# Patient Record
Sex: MALE | Race: WHITE | NOT HISPANIC OR LATINO | Employment: OTHER | URBAN - METROPOLITAN AREA
[De-identification: names, ages, dates, MRNs, and addresses within clinical notes are randomized per-mention and may not be internally consistent; named-entity substitution may affect disease eponyms.]

---

## 2018-06-10 ENCOUNTER — APPOINTMENT (OUTPATIENT)
Dept: GENERAL RADIOLOGY | Facility: HOSPITAL | Age: 83
End: 2018-06-10

## 2018-06-10 ENCOUNTER — HOSPITAL ENCOUNTER (EMERGENCY)
Facility: HOSPITAL | Age: 83
Discharge: HOME | End: 2018-06-11
Attending: EMERGENCY MEDICINE | Admitting: EMERGENCY MEDICINE

## 2018-06-10 DIAGNOSIS — R31.9 HEMATURIA, UNSPECIFIED TYPE: ICD-10-CM

## 2018-06-10 DIAGNOSIS — N20.1 URETEROLITHIASIS: ICD-10-CM

## 2018-06-10 DIAGNOSIS — N32.89 MASS OF URINARY BLADDER: ICD-10-CM

## 2018-06-10 DIAGNOSIS — R10.84 GENERALIZED ABDOMINAL PAIN: Primary | ICD-10-CM

## 2018-06-10 LAB
ALBUMIN SERPL-MCNC: 3.3 G/DL (ref 3.5–5.2)
ALBUMIN/GLOB SERPL: 1 G/DL
ALP SERPL-CCNC: 69 U/L (ref 40–129)
ALT SERPL W P-5'-P-CCNC: 10 U/L (ref 5–41)
ANION GAP SERPL CALCULATED.3IONS-SCNC: 10.1 MMOL/L
AST SERPL-CCNC: 20 U/L (ref 5–40)
BASOPHILS # BLD AUTO: 0.03 10*3/MM3 (ref 0–0.2)
BASOPHILS NFR BLD AUTO: 0.4 % (ref 0–2)
BILIRUB SERPL-MCNC: 0.2 MG/DL (ref 0.2–1.2)
BUN BLD-MCNC: 15 MG/DL (ref 8–23)
BUN/CREAT SERPL: 12 (ref 7–25)
CALCIUM SPEC-SCNC: 9 MG/DL (ref 8.8–10.5)
CHLORIDE SERPL-SCNC: 103 MMOL/L (ref 98–107)
CO2 SERPL-SCNC: 25.9 MMOL/L (ref 22–29)
CREAT BLD-MCNC: 1.25 MG/DL (ref 0.76–1.27)
DEPRECATED RDW RBC AUTO: 46.8 FL (ref 37–54)
EOSINOPHIL # BLD AUTO: 0.08 10*3/MM3 (ref 0.1–0.3)
EOSINOPHIL NFR BLD AUTO: 1.1 % (ref 0–4)
ERYTHROCYTE [DISTWIDTH] IN BLOOD BY AUTOMATED COUNT: 13.8 % (ref 11.5–14.5)
GFR SERPL CREATININE-BSD FRML MDRD: 54 ML/MIN/1.73
GLOBULIN UR ELPH-MCNC: 3.2 GM/DL
GLUCOSE BLD-MCNC: 104 MG/DL (ref 65–99)
HCT VFR BLD AUTO: 44.3 % (ref 42–52)
HGB BLD-MCNC: 14.6 G/DL (ref 14–18)
IMM GRANULOCYTES # BLD: 0.03 10*3/MM3 (ref 0–0.03)
IMM GRANULOCYTES NFR BLD: 0.4 % (ref 0–0.5)
LARGE PLATELETS: NORMAL
LIPASE SERPL-CCNC: 39 U/L (ref 13–60)
LYMPHOCYTES # BLD AUTO: 1.61 10*3/MM3 (ref 0.6–4.8)
LYMPHOCYTES NFR BLD AUTO: 21.6 % (ref 20–45)
MCH RBC QN AUTO: 30.3 PG (ref 27–31)
MCHC RBC AUTO-ENTMCNC: 33 G/DL (ref 31–37)
MCV RBC AUTO: 91.9 FL (ref 80–94)
MONOCYTES # BLD AUTO: 0.51 10*3/MM3 (ref 0–1)
MONOCYTES NFR BLD AUTO: 6.9 % (ref 3–8)
NEUTROPHILS # BLD AUTO: 5.18 10*3/MM3 (ref 1.5–8.3)
NEUTROPHILS NFR BLD AUTO: 69.6 % (ref 45–70)
NRBC BLD MANUAL-RTO: 0 /100 WBC (ref 0–0)
PLATELET # BLD AUTO: 77 10*3/MM3 (ref 140–500)
PMV BLD AUTO: 11.4 FL (ref 7.4–10.4)
POTASSIUM BLD-SCNC: 3.8 MMOL/L (ref 3.5–5.2)
PROT SERPL-MCNC: 6.5 G/DL (ref 6–8.5)
RBC # BLD AUTO: 4.82 10*6/MM3 (ref 4.7–6.1)
RBC MORPH BLD: NORMAL
SMALL PLATELETS BLD QL SMEAR: NORMAL
SODIUM BLD-SCNC: 139 MMOL/L (ref 136–145)
TROPONIN T SERPL-MCNC: <0.01 NG/ML (ref 0–0.03)
WBC MORPH BLD: NORMAL
WBC NRBC COR # BLD: 7.44 10*3/MM3 (ref 4.8–10.8)

## 2018-06-10 PROCEDURE — 80053 COMPREHEN METABOLIC PANEL: CPT | Performed by: EMERGENCY MEDICINE

## 2018-06-10 PROCEDURE — 93010 ELECTROCARDIOGRAM REPORT: CPT | Performed by: INTERNAL MEDICINE

## 2018-06-10 PROCEDURE — 96374 THER/PROPH/DIAG INJ IV PUSH: CPT

## 2018-06-10 PROCEDURE — 71046 X-RAY EXAM CHEST 2 VIEWS: CPT

## 2018-06-10 PROCEDURE — 83690 ASSAY OF LIPASE: CPT | Performed by: EMERGENCY MEDICINE

## 2018-06-10 PROCEDURE — 99285 EMERGENCY DEPT VISIT HI MDM: CPT

## 2018-06-10 PROCEDURE — 99284 EMERGENCY DEPT VISIT MOD MDM: CPT | Performed by: EMERGENCY MEDICINE

## 2018-06-10 PROCEDURE — 93005 ELECTROCARDIOGRAM TRACING: CPT | Performed by: EMERGENCY MEDICINE

## 2018-06-10 PROCEDURE — 85007 BL SMEAR W/DIFF WBC COUNT: CPT | Performed by: EMERGENCY MEDICINE

## 2018-06-10 PROCEDURE — 85025 COMPLETE CBC W/AUTO DIFF WBC: CPT | Performed by: EMERGENCY MEDICINE

## 2018-06-10 PROCEDURE — 84484 ASSAY OF TROPONIN QUANT: CPT | Performed by: EMERGENCY MEDICINE

## 2018-06-10 RX ORDER — FAMOTIDINE 20 MG/50ML
INJECTION, SOLUTION INTRAVENOUS
Status: DISCONTINUED
Start: 2018-06-10 | End: 2018-06-11 | Stop reason: HOSPADM

## 2018-06-10 RX ORDER — FAMOTIDINE 10 MG/ML
20 INJECTION, SOLUTION INTRAVENOUS ONCE
Status: COMPLETED | OUTPATIENT
Start: 2018-06-10 | End: 2018-06-10

## 2018-06-10 RX ORDER — ALUMINA, MAGNESIA, AND SIMETHICONE 2400; 2400; 240 MG/30ML; MG/30ML; MG/30ML
15 SUSPENSION ORAL ONCE
Status: COMPLETED | OUTPATIENT
Start: 2018-06-10 | End: 2018-06-10

## 2018-06-10 RX ORDER — SODIUM CHLORIDE 0.9 % (FLUSH) 0.9 %
10 SYRINGE (ML) INJECTION AS NEEDED
Status: DISCONTINUED | OUTPATIENT
Start: 2018-06-10 | End: 2018-06-11 | Stop reason: HOSPADM

## 2018-06-10 RX ADMIN — FAMOTIDINE 20 MG: 10 INJECTION, SOLUTION INTRAVENOUS at 23:16

## 2018-06-10 RX ADMIN — LIDOCAINE HYDROCHLORIDE 15 ML: 20 SOLUTION ORAL; TOPICAL at 23:13

## 2018-06-10 RX ADMIN — ALUMINUM HYDROXIDE, MAGNESIUM HYDROXIDE, AND DIMETHICONE 15 ML: 400; 400; 40 SUSPENSION ORAL at 23:13

## 2018-06-11 ENCOUNTER — APPOINTMENT (OUTPATIENT)
Dept: CT IMAGING | Facility: HOSPITAL | Age: 83
End: 2018-06-11

## 2018-06-11 VITALS
HEART RATE: 85 BPM | OXYGEN SATURATION: 91 % | RESPIRATION RATE: 19 BRPM | HEIGHT: 68 IN | TEMPERATURE: 98.1 F | WEIGHT: 185 LBS | BODY MASS INDEX: 28.04 KG/M2 | SYSTOLIC BLOOD PRESSURE: 121 MMHG | DIASTOLIC BLOOD PRESSURE: 92 MMHG

## 2018-06-11 LAB
BACTERIA UR QL AUTO: ABNORMAL /HPF
BILIRUB UR QL STRIP: NEGATIVE
CLARITY UR: CLEAR
COLOR UR: YELLOW
GLUCOSE UR STRIP-MCNC: NEGATIVE MG/DL
HGB UR QL STRIP.AUTO: ABNORMAL
HYALINE CASTS UR QL AUTO: ABNORMAL /LPF
KETONES UR QL STRIP: NEGATIVE
LEUKOCYTE ESTERASE UR QL STRIP.AUTO: NEGATIVE
NITRITE UR QL STRIP: NEGATIVE
PH UR STRIP.AUTO: 6.5 [PH] (ref 4.5–8)
PROT UR QL STRIP: NEGATIVE
RBC # UR: ABNORMAL /HPF
REF LAB TEST METHOD: ABNORMAL
SP GR UR STRIP: 1.01 (ref 1–1.03)
SQUAMOUS #/AREA URNS HPF: ABNORMAL /HPF
UROBILINOGEN UR QL STRIP: ABNORMAL
WBC UR QL AUTO: ABNORMAL /HPF

## 2018-06-11 PROCEDURE — 81001 URINALYSIS AUTO W/SCOPE: CPT | Performed by: EMERGENCY MEDICINE

## 2018-06-11 PROCEDURE — 74176 CT ABD & PELVIS W/O CONTRAST: CPT

## 2018-06-11 RX ORDER — HYDROCODONE BITARTRATE AND ACETAMINOPHEN 5; 325 MG/1; MG/1
1 TABLET ORAL ONCE
Status: COMPLETED | OUTPATIENT
Start: 2018-06-11 | End: 2018-06-11

## 2018-06-11 RX ORDER — DOCUSATE SODIUM 100 MG/1
100 CAPSULE, LIQUID FILLED ORAL 2 TIMES DAILY PRN
Qty: 30 CAPSULE | Refills: 0 | Status: SHIPPED | OUTPATIENT
Start: 2018-06-11

## 2018-06-11 RX ORDER — HYDROCODONE BITARTRATE AND ACETAMINOPHEN 5; 325 MG/1; MG/1
TABLET ORAL
Qty: 20 TABLET | Refills: 0 | Status: SHIPPED | OUTPATIENT
Start: 2018-06-11

## 2018-06-11 RX ADMIN — HYDROCODONE BITARTRATE AND ACETAMINOPHEN 1 TABLET: 5; 325 TABLET ORAL at 03:12

## 2018-06-11 NOTE — ED NOTES
Pt reminded that he cannot have anything to drink until CT results are back and per MD approval.     Rebeca Salcedo RN  06/11/18 3148

## 2018-06-11 NOTE — ED NOTES
"Pt states \"i don't know if I can pee or not\". Attempting to urinate     Rebeca Salcedo RN  06/11/18 0030    "

## 2018-06-11 NOTE — ED PROVIDER NOTES
EMERGENCY DEPARTMENT ENCOUNTER      Room Number: 5/05      HPI:    Chief complaint: Abdominal pain    Location: Periumbilical then radiated up the left flank posterior chest    Quality/Severity:  Sharp    Timing/Duration: Abrupt onset 1 hour prior to arrival, much improved at this time    Modifying Factors: None clearly identified    Associated Symptoms: No nausea or vomiting.  Occasional bright red blood noted on tissue paper after stooling but no dark black stools noted.  Some cough near baseline area no fevers.    Narrative: Pt is a 89 y.o. male who presents complaining of transient episode of sharp abdominal pain that began.  Umbilical radiating to the left flank and up the posterior chest.  Symptoms significant improved at this time.  Began shortly after eating supper.      PMD: PHYLLIS Mullins    REVIEW OF SYSTEMS  Review of Systems  All other systems reviewed are otherwise negative as related chief complaint    PAST MEDICAL HISTORY  Active Ambulatory Problems     Diagnosis Date Noted   • No Active Ambulatory Problems     Resolved Ambulatory Problems     Diagnosis Date Noted   • No Resolved Ambulatory Problems     Past Medical History:   Diagnosis Date   • COPD (chronic obstructive pulmonary disease)    • GERD (gastroesophageal reflux disease)    • Peptic ulcer    • Prostate cancer    • Pulmonary embolism        PAST SURGICAL HISTORY  Past Surgical History:   Procedure Laterality Date   • ENDOSCOPY W/ PEG REMOVAL     • EYE ENUCLEATION Left        FAMILY HISTORY  History reviewed. No pertinent family history.    SOCIAL HISTORY  Social History     Social History   • Marital status:      Spouse name: N/A   • Number of children: N/A   • Years of education: N/A     Occupational History   • Not on file.     Social History Main Topics   • Smoking status: Current Every Day Smoker     Packs/day: 2.00     Types: Cigarettes   • Smokeless tobacco: Not on file   • Alcohol use No   • Drug use: No   • Sexual  activity: Defer     Other Topics Concern   • Not on file     Social History Narrative   • No narrative on file       ALLERGIES  Patient has no known allergies.    PHYSICAL EXAM  ED Triage Vitals [06/10/18 2241]   Temp Heart Rate Resp BP SpO2   98.1 °F (36.7 °C) 80 18 141/66 92 %      Temp src Heart Rate Source Patient Position BP Location FiO2 (%)   Oral -- -- Right arm --         Physical Exam   Constitutional: He is oriented to person, place, and time and well-developed, well-nourished, and in no distress. No distress.   HENT:   Head: Normocephalic.   Mucous membranes moist   Eyes: No scleral icterus.   False left eye   Neck:   Painless range of motion   Cardiovascular: Normal rate and regular rhythm.    Pulmonary/Chest: Effort normal. No respiratory distress. He exhibits no tenderness.   Diminished but clear   Abdominal: Soft.   Large soft abdomen with minimal tenderness in the epigastric region with deep palpation.  No rebound or rigidity noted.  No CVA tenderness.   Musculoskeletal: Edema: ild pitting edema bilateral ankles.   Moves all extremities equally   Neurological: He is alert and oriented to person, place, and time.   Skin: Skin is warm and dry.   Psychiatric: Mood and affect normal.   Nursing note and vitals reviewed.      LAB RESULTS  Results for orders placed or performed during the hospital encounter of 06/10/18   Comprehensive Metabolic Panel   Result Value Ref Range    Glucose 104 (H) 65 - 99 mg/dL    BUN 15 8 - 23 mg/dL    Creatinine 1.25 0.76 - 1.27 mg/dL    Sodium 139 136 - 145 mmol/L    Potassium 3.8 3.5 - 5.2 mmol/L    Chloride 103 98 - 107 mmol/L    CO2 25.9 22.0 - 29.0 mmol/L    Calcium 9.0 8.8 - 10.5 mg/dL    Total Protein 6.5 6.0 - 8.5 g/dL    Albumin 3.30 (L) 3.50 - 5.20 g/dL    ALT (SGPT) 10 5 - 41 U/L    AST (SGOT) 20 5 - 40 U/L    Alkaline Phosphatase 69 40 - 129 U/L    Total Bilirubin 0.2 0.2 - 1.2 mg/dL    eGFR Non African Amer 54 (L) >60 mL/min/1.73    Globulin 3.2 gm/dL    A/G  Ratio 1.0 g/dL    BUN/Creatinine Ratio 12.0 7.0 - 25.0    Anion Gap 10.1 mmol/L   Troponin   Result Value Ref Range    Troponin T <0.010 0.000 - 0.030 ng/mL   CBC Auto Differential   Result Value Ref Range    WBC 7.44 4.80 - 10.80 10*3/mm3    RBC 4.82 4.70 - 6.10 10*6/mm3    Hemoglobin 14.6 14.0 - 18.0 g/dL    Hematocrit 44.3 42.0 - 52.0 %    MCV 91.9 80.0 - 94.0 fL    MCH 30.3 27.0 - 31.0 pg    MCHC 33.0 31.0 - 37.0 g/dL    RDW 13.8 11.5 - 14.5 %    RDW-SD 46.8 37.0 - 54.0 fl    MPV 11.4 (H) 7.4 - 10.4 fL    Platelets 77 (L) 140 - 500 10*3/mm3    Neutrophil % 69.6 45.0 - 70.0 %    Lymphocyte % 21.6 20.0 - 45.0 %    Monocyte % 6.9 3.0 - 8.0 %    Eosinophil % 1.1 0.0 - 4.0 %    Basophil % 0.4 0.0 - 2.0 %    Immature Grans % 0.4 0.0 - 0.5 %    Neutrophils, Absolute 5.18 1.50 - 8.30 10*3/mm3    Lymphocytes, Absolute 1.61 0.60 - 4.80 10*3/mm3    Monocytes, Absolute 0.51 0.00 - 1.00 10*3/mm3    Eosinophils, Absolute 0.08 (L) 0.10 - 0.30 10*3/mm3    Basophils, Absolute 0.03 0.00 - 0.20 10*3/mm3    Immature Grans, Absolute 0.03 0.00 - 0.03 10*3/mm3    nRBC 0.0 0.0 - 0.0 /100 WBC   Lipase   Result Value Ref Range    Lipase 39 13 - 60 U/L   Urinalysis With / Microscopic If Indicated (No Culture) - Urine, Clean Catch   Result Value Ref Range    Color, UA Yellow Yellow, Straw    Appearance, UA Clear Clear    pH, UA 6.5 4.5 - 8.0    Specific Gravity, UA 1.015 1.003 - 1.030    Glucose, UA Negative Negative    Ketones, UA Negative Negative, 80 mg/dL (3+), >=160 mg/dL (4+)    Bilirubin, UA Negative Negative    Blood, UA Moderate (2+) (A) Negative    Protein, UA Negative Negative    Leuk Esterase, UA Negative Negative    Nitrite, UA Negative Negative    Urobilinogen, UA 0.2 E.U./dL 0.2 - 1.0 E.U./dL   Scan Slide   Result Value Ref Range    RBC Morphology Normal Normal    WBC Morphology Normal Normal    Platelet Estimate Decreased Normal    Large Platelets Slight/1+ None Seen   Urinalysis, Microscopic Only - Urine, Clean Catch    Result Value Ref Range    RBC, UA 13-20 (A) None Seen /HPF    WBC, UA 3-5 (A) None Seen /HPF    Bacteria, UA None Seen None Seen /HPF    Squamous Epithelial Cells, UA 0-2 None Seen, 0-2 /HPF    Hyaline Casts, UA None Seen None Seen /LPF    Methodology Manual Light Microscopy          I ordered the above labs and reviewed the results    RADIOLOGY  RADIOLOGY        Study: Chest x-ray-2 views    Findings: Rotated image, large hiatal hernia, no acute infiltrate or pneumothorax appreciated; unchanged from prior    Interpreted contemporaneously with treatment by me, independently viewed by me      RADIOLOGY        Study: CT abdomen and pelvis stone protocol    Findings: Mucus plugging left lower lobe bronchi.  2.3 mm proximal right ureteral calculus.  Abnormal retroperitoneal and left pelvic lymphadenopathy concerning for possible bladder mass.  Numerous incidental findings will be detailed in final report.    Interpreted contemporaneously with treatment by Claire-radiologist, independently viewed by me        I ordered the above radiologic testing and reviewed the results    PROCEDURES  Procedures    EKG           EKG time / Interpretation time: 2233 / 2236  Rhythm/Rate: Sinus, 85  P waves and ND: JOE within normal limits  QRS, axis: Narrow QRS complex   ST and T waves: No STEMI; diffuse T wave inversions inferior laterally; QTC within normal limits     Interpreted Contemporaneously by me, independently viewed        PROGRESS AND CONSULTS  ED Course as of Jun 11 0244   Mon Jun 11, 2018   0141 Patient has had only a few twinges of discomfort since his arrival.  Secondary to his hematuria and remote history of ureterolithiasis he is interested in a CT abdomen and pelvis to evaluate for possibility of recurrence of the source of his discomfort.  I'm agreeable with this plan.  [RS]   0243 CT and lab work reviewed with patient and family.  All agreeable with discharge and close outpatient follow-up planning.  [RS]      ED  Course User Index  [RS] Dino Toney MD           MEDICAL DECISION MAKING  Results were reviewed/discussed with the patient and they were also made aware of online access. Pt also made aware that some labs, such as cultures, will not be resulted during ER visit and follow up with PMD is necessary.     JAIME Red query complete. Treatment plan to include limited course of prescribed controlled substance.  Risks including addiction, tolerance, sedation, benefits and alternatives presented to patient.  DIAGNOSIS  Final diagnoses:   Generalized abdominal pain   Ureterolithiasis   Hematuria, unspecified type   Mass of urinary bladder       Latest Documented Vital Signs:  As of 2:43 AM  BP- 116/67 HR- 79 Temp- 98.1 °F (36.7 °C) (Oral) O2 sat- 93%    DISPOSITION  Discharged home in stable condition       Medication List      New Prescriptions    docusate sodium 100 MG capsule  Commonly known as:  COLACE  Take 1 capsule by mouth 2 (Two) Times a Day As Needed for Constipation.   Take 1 tablet by mouth 2 times daily as needed for constipation     HYDROcodone-acetaminophen 5-325 MG per tablet  Commonly known as:  NORCO  Take 1-2 tabs by mouth every 4-6 hours as needed for pain  Replaces:  HYDROcodone-acetaminophen  MG per tablet        Stop    HYDROcodone-acetaminophen  MG per tablet  Commonly known as:  NORCO  Replaced by:  HYDROcodone-acetaminophen 5-325 MG per tablet          Follow-up Information     Schedule an appointment as soon as possible for a visit  with PHYLLIS Mullins.    Specialty:  Family Medicine  Why:  As needed  Contact information:  [address]  [address]  [facility] [address] [phone]             Be Toney MD. Call today.    Specialty:  Urology  Why:  Schedule outpatient follow-up for kidney stone and possible bladder mass  Contact information:  [address]  [facility] [address] [phone]                        Dino Toney MD  06/11/18 5260

## 2018-06-17 ENCOUNTER — HOSPITAL ENCOUNTER (EMERGENCY)
Facility: HOSPITAL | Age: 83
Discharge: HOME | End: 2018-06-17
Attending: EMERGENCY MEDICINE | Admitting: EMERGENCY MEDICINE

## 2018-06-17 VITALS
BODY MASS INDEX: 27.27 KG/M2 | TEMPERATURE: 97.2 F | OXYGEN SATURATION: 93 % | RESPIRATION RATE: 22 BRPM | SYSTOLIC BLOOD PRESSURE: 107 MMHG | HEIGHT: 69 IN | WEIGHT: 184.13 LBS | HEART RATE: 76 BPM | DIASTOLIC BLOOD PRESSURE: 49 MMHG

## 2018-06-17 DIAGNOSIS — R10.9 LEFT FLANK PAIN: Primary | ICD-10-CM

## 2018-06-17 DIAGNOSIS — N28.89 LEFT RENAL MASS: ICD-10-CM

## 2018-06-17 LAB
ALBUMIN SERPL-MCNC: 3.1 G/DL (ref 3.5–5.2)
ALBUMIN/GLOB SERPL: 1 G/DL
ALP SERPL-CCNC: 73 U/L (ref 40–129)
ALT SERPL W P-5'-P-CCNC: 11 U/L (ref 5–41)
ANION GAP SERPL CALCULATED.3IONS-SCNC: 10.7 MMOL/L
AST SERPL-CCNC: 21 U/L (ref 5–40)
BACTERIA UR QL AUTO: ABNORMAL /HPF
BASOPHILS # BLD AUTO: 0.03 10*3/MM3 (ref 0–0.2)
BASOPHILS NFR BLD AUTO: 0.5 % (ref 0–2)
BILIRUB SERPL-MCNC: 0.3 MG/DL (ref 0.2–1.2)
BILIRUB UR QL STRIP: NEGATIVE
BUN BLD-MCNC: 15 MG/DL (ref 8–23)
BUN/CREAT SERPL: 16.7 (ref 7–25)
CALCIUM SPEC-SCNC: 8.8 MG/DL (ref 8.8–10.5)
CHLORIDE SERPL-SCNC: 103 MMOL/L (ref 98–107)
CLARITY UR: CLEAR
CO2 SERPL-SCNC: 25.3 MMOL/L (ref 22–29)
COLOR UR: YELLOW
CREAT BLD-MCNC: 0.9 MG/DL (ref 0.76–1.27)
DEPRECATED RDW RBC AUTO: 46.5 FL (ref 37–54)
EOSINOPHIL # BLD AUTO: 0.09 10*3/MM3 (ref 0.1–0.3)
EOSINOPHIL NFR BLD AUTO: 1.5 % (ref 0–4)
ERYTHROCYTE [DISTWIDTH] IN BLOOD BY AUTOMATED COUNT: 13.9 % (ref 11.5–14.5)
GFR SERPL CREATININE-BSD FRML MDRD: 79 ML/MIN/1.73
GLOBULIN UR ELPH-MCNC: 3.1 GM/DL
GLUCOSE BLD-MCNC: 109 MG/DL (ref 65–99)
GLUCOSE UR STRIP-MCNC: NEGATIVE MG/DL
HCT VFR BLD AUTO: 41.9 % (ref 42–52)
HGB BLD-MCNC: 14 G/DL (ref 14–18)
HGB UR QL STRIP.AUTO: ABNORMAL
HYALINE CASTS UR QL AUTO: ABNORMAL /LPF
IMM GRANULOCYTES # BLD: 0.03 10*3/MM3 (ref 0–0.03)
IMM GRANULOCYTES NFR BLD: 0.5 % (ref 0–0.5)
KETONES UR QL STRIP: NEGATIVE
LEUKOCYTE ESTERASE UR QL STRIP.AUTO: NEGATIVE
LYMPHOCYTES # BLD AUTO: 1.08 10*3/MM3 (ref 0.6–4.8)
LYMPHOCYTES NFR BLD AUTO: 17.7 % (ref 20–45)
MCH RBC QN AUTO: 30.4 PG (ref 27–31)
MCHC RBC AUTO-ENTMCNC: 33.4 G/DL (ref 31–37)
MCV RBC AUTO: 91.1 FL (ref 80–94)
MONOCYTES # BLD AUTO: 0.46 10*3/MM3 (ref 0–1)
MONOCYTES NFR BLD AUTO: 7.6 % (ref 3–8)
NEUTROPHILS # BLD AUTO: 4.4 10*3/MM3 (ref 1.5–8.3)
NEUTROPHILS NFR BLD AUTO: 72.2 % (ref 45–70)
NITRITE UR QL STRIP: NEGATIVE
NRBC BLD MANUAL-RTO: 0 /100 WBC (ref 0–0)
PH UR STRIP.AUTO: 6 [PH] (ref 4.5–8)
PLATELET # BLD AUTO: 57 10*3/MM3 (ref 140–500)
PMV BLD AUTO: 12.1 FL (ref 7.4–10.4)
POTASSIUM BLD-SCNC: 4 MMOL/L (ref 3.5–5.2)
PROT SERPL-MCNC: 6.2 G/DL (ref 6–8.5)
PROT UR QL STRIP: NEGATIVE
RBC # BLD AUTO: 4.6 10*6/MM3 (ref 4.7–6.1)
RBC # UR: ABNORMAL /HPF
REF LAB TEST METHOD: ABNORMAL
SODIUM BLD-SCNC: 139 MMOL/L (ref 136–145)
SP GR UR STRIP: 1.02 (ref 1–1.03)
SQUAMOUS #/AREA URNS HPF: ABNORMAL /HPF
TROPONIN T SERPL-MCNC: <0.01 NG/ML (ref 0–0.03)
UROBILINOGEN UR QL STRIP: ABNORMAL
WBC NRBC COR # BLD: 6.09 10*3/MM3 (ref 4.8–10.8)
WBC UR QL AUTO: ABNORMAL /HPF

## 2018-06-17 PROCEDURE — 80053 COMPREHEN METABOLIC PANEL: CPT | Performed by: EMERGENCY MEDICINE

## 2018-06-17 PROCEDURE — 93010 ELECTROCARDIOGRAM REPORT: CPT | Performed by: INTERNAL MEDICINE

## 2018-06-17 PROCEDURE — 93005 ELECTROCARDIOGRAM TRACING: CPT | Performed by: EMERGENCY MEDICINE

## 2018-06-17 PROCEDURE — 85025 COMPLETE CBC W/AUTO DIFF WBC: CPT | Performed by: EMERGENCY MEDICINE

## 2018-06-17 PROCEDURE — 99284 EMERGENCY DEPT VISIT MOD MDM: CPT | Performed by: EMERGENCY MEDICINE

## 2018-06-17 PROCEDURE — 84484 ASSAY OF TROPONIN QUANT: CPT | Performed by: EMERGENCY MEDICINE

## 2018-06-17 PROCEDURE — 99284 EMERGENCY DEPT VISIT MOD MDM: CPT

## 2018-06-17 PROCEDURE — 81001 URINALYSIS AUTO W/SCOPE: CPT | Performed by: EMERGENCY MEDICINE

## 2018-06-17 RX ORDER — POTASSIUM CHLORIDE 750 MG/1
10 TABLET, FILM COATED, EXTENDED RELEASE ORAL 2 TIMES DAILY
COMMUNITY

## 2018-06-17 NOTE — DISCHARGE INSTRUCTIONS
Follow up with Dr. Clarence Toney as scheduled on Monday.  You may take your hydrocodone 5, 1-2 po every 4 hours as needed for pain.

## 2018-06-17 NOTE — ED PROVIDER NOTES
Subjective   History of Present Illness  History of Present Illness    Chief complaint: Pain    Location: Left flank    Quality/Severity:  Moderate, sharp    Timing/Onset/Duration: Started last night and got worse today    Modifying Factors: Nothing seems to make it better or worse    Associated Symptoms: No headache.  No fever chills.  The patient has had a cough productive of some yellowish sputum for the last 6 months or so.  He states he's been told he has emphysema..  No sore throat earache or nasal congestion.  No chest pain or shortness of breath.  No abdominal pain.  No diarrhea or burning when he urinates.  No numbness, tingling, weakness, or change in bladder or bowel function.    Narrative: 89-year-old white male who states that he was diagnosed with a kidney stone last week, presents with flank pain that started again last night and got worse today.  The patient denies any fever chills.  He has had a cough for the last 6 months and has cough productive of yellowish sputum.  Said he has been diagnosed with emphysema.  The cough is no worse than usual.  No chest pain or shortness of breath.  No abdominal pain.  He does complain of left flank pain.  No diarrhea or burning when he urinates.    PCP:  Gus      Review of Systems   Constitutional: Negative for chills and fever.   HENT: Negative for ear pain and sore throat.    Eyes: Negative for discharge and redness.   Respiratory: Negative for cough, chest tightness and shortness of breath.    Cardiovascular: Negative for chest pain and leg swelling.   Gastrointestinal: Negative for abdominal pain, blood in stool, constipation, diarrhea, nausea and vomiting.   Endocrine: Negative for polyuria.   Genitourinary: Positive for flank pain. Negative for decreased urine volume, dysuria, frequency, hematuria and urgency.   Musculoskeletal: Negative for back pain, neck pain and neck stiffness.   Skin: Negative for rash.   Neurological: Negative for dizziness,  weakness, light-headedness, numbness and headaches.   Hematological: Negative for adenopathy.   Psychiatric/Behavioral: Negative.  Negative for confusion.        Medication List      ASK your doctor about these medications    docusate sodium 100 MG capsule  Commonly known as:  COLACE  Take 1 capsule by mouth 2 (Two) Times a Day As Needed for Constipation.   Take 1 tablet by mouth 2 times daily as needed for constipation     furosemide 20 MG tablet  Commonly known as:  LASIX     gabapentin 300 MG capsule  Commonly known as:  NEURONTIN     HYDROcodone-acetaminophen 5-325 MG per tablet  Commonly known as:  NORCO  Take 1-2 tabs by mouth every 4-6 hours as needed for pain     omeprazole 20 MG capsule  Commonly known as:  priLOSEC     potassium chloride 10 MEQ CR tablet  Commonly known as:  K-DUR     rivaroxaban 20 MG tablet  Commonly known as:  XARELTO     tamsulosin 0.4 MG capsule 24 hr capsule  Commonly known as:  FLOMAX          Past Medical History:   Diagnosis Date   • COPD (chronic obstructive pulmonary disease)    • GERD (gastroesophageal reflux disease)    • Peptic ulcer    • Prostate cancer    • Pulmonary embolism        No Known Allergies    Past Surgical History:   Procedure Laterality Date   • ENDOSCOPY W/ PEG REMOVAL     • EYE ENUCLEATION Left        History reviewed. No pertinent family history.    Social History     Social History   • Marital status:      Social History Main Topics   • Smoking status: Current Every Day Smoker     Packs/day: 2.00     Types: Cigarettes   • Alcohol use No   • Drug use: No   • Sexual activity: Defer     Other Topics Concern   • Not on file           Objective   Physical Exam   Constitutional: He is oriented to person, place, and time. He appears well-developed and well-nourished. No distress.   ED Triage Vitals  Temp: 97.2 °F (36.2 °C) (06/17/18 1109)  Heart Rate: 101 (06/17/18 1102)  Resp: 22 (06/17/18 1102)  BP: 137/71 (06/17/18 1102)  SpO2: 93 % (06/17/18 1102)  Temp  src: Oral (06/17/18 1109)  Heart Rate Source: Monitor (06/17/18 1102)  Patient Position: n/a  BP Location: n/a  FiO2 (%): n/a    The patient's vitals were reviewed by me.  Unless otherwise noted they are within normal limits.     HENT:   Head: Normocephalic and atraumatic.   Right Ear: External ear normal.   Left Ear: External ear normal.   Nose: Nose normal.   Mouth/Throat: Oropharynx is clear and moist. No oropharyngeal exudate.   False left eye   Eyes: Conjunctivae and EOM are normal. Pupils are equal, round, and reactive to light. Right eye exhibits no discharge. Left eye exhibits no discharge. No scleral icterus.   Neck: Normal range of motion. Neck supple. No JVD present. No tracheal deviation present. No thyromegaly present.   Cardiovascular: Normal rate, regular rhythm, normal heart sounds and intact distal pulses.  Exam reveals no gallop and no friction rub.    No murmur heard.  Pulmonary/Chest: Effort normal and breath sounds normal. No stridor. No respiratory distress. He has no wheezes. He has no rales. He exhibits no tenderness.   Abdominal: Soft. Bowel sounds are normal. He exhibits distension. He exhibits no mass. There is no tenderness. There is no rebound and no guarding. No hernia.   Musculoskeletal: Normal range of motion. He exhibits no edema or deformity.   There is mild left flank pain   Lymphadenopathy:     He has no cervical adenopathy.   Neurological: He is alert and oriented to person, place, and time.   Skin: Skin is warm and dry. No rash noted. He is not diaphoretic. No erythema. No pallor.   Psychiatric: His behavior is normal.   Nursing note and vitals reviewed.      Procedures           ED Course  ED Course as of Jun 17 1304   Sun Jun 17, 2018   1302 The laboratory values were reviewed by me.  Urine microscopic shows 3-5 RBCs, 0 to 2 wbc's.  The hematocrit is 41.  The platelet count is 57,000.  The neutrophil percent is 72%.  The serum glucose is 109.  The albumin is 3.1.  Lipitor  values are otherwise unremarkable.  [RC]      ED Course User Index  [RC] Vinicius Friedman MD      11:59 AM, 06/17/18:  The EKG was obtained at 1153.  EKG was done me at 1153.  EKG shows a normal sinus rhythm with rate of 79.  There is a borderline left axis deviation.  There is no hypertrophy.  The AL, QRS, QT intervals are unremarkable.  Is nonspecific T-wave abnormalities diffusely.  Is no acute ST elevation.  The EKG today was compared with an EKG dated Apolonia 10, 2018.  It is essentially unchanged.    1:08 PM, 06/17/18:  The patient was reassessed.  He feels better.  His vital signs were reviewed and are stable.  Abdominal exam: Soft, mild left-sided abdominal tenderness, no rebound, no guarding, no masses, positive bowel sounds.    1:09 PM, 06/17/18:  Patient's diagnosis of left renal mass, and right kidney stones were discussed with him.  The patient should keep the appointment that he has on Monday for Dr. Toney.  Should continue to take his pain medication as needed as prescribed.  States he does have pain medicine at home.  All the patient's questions were answered the patient will be discharged in good condition.            MDM    No orders to display     Labs Reviewed - No data to display  Xr Chest 2 View    Result Date: 6/11/2018  Narrative: INDICATION: Left posterior chest pain since 9:00 to 9.  COMPARISON: 10/11/2016.  FINDINGS: PA and lateral views of the chest were obtained. Heart remains enlarged. There is a large hiatal hernia. There is infiltrate or atelectasis at the left base. Right lung is clear. There is emphysema. No pneumothorax. Patient has a prominent thoracic kyphosis which is unchanged.      Impression: Left lower lobe atelectasis or infiltrate. Otherwise, no significant change.  This report was finalized on 6/11/2018 10:25 AM by Dr. Pravin Palm MD.      Ct Abdomen Pelvis Stone Protocol    Result Date: 6/11/2018  Narrative: CT ABDOMEN PELVIS  HISTORY: Left flank pain and hematuria  started one hour ago.  COMPARISON: None.  TECHNIQUE: Axial noncontrast images were obtained through the abdomen and pelvis. Multiplanar reformats were obtained. Radiation dose reduction techniques were utilized, including automated exposure control and exposure modulation based on body size.  0 prior CT scans or cardiac nuclear medicine scans in the last 12 months.  ABDOMEN FINDINGS: There is emphysema. There is infiltrate or atelectasis in the left lung base. There is a mucous plugging in left lower lobe bronchi. There is extensive atherosclerotic disease and coronary artery disease. There is a large hiatal hernia containing the fundus of the stomach and a portion of the gastric body.  There are scattered colonic diverticula. No bowel obstruction is seen. Gallbladder surgically absent. There is pneumobilia suggesting prior sphincterotomy. There is a 3.5 mm proximal ureteral stone on the right with mild right hydronephrosis. Bilateral renal cysts are present, including a large right parapelvic cyst measuring up to 7.8 cm. Renal cortical calcifications are noted on the right suggesting an area of scarring. Vascular calcifications are present in the right kidney as well. There is a 1.5 cm indeterminate exophytic lesion in the lateral cortex of the left kidney. This could reflect a slightly hyperdense cyst. Consider multiphase CT for follow-up. Remaining solid organs are normal. There is retroperitoneal and retrocrural adenopathy. Right-sided retrocrural node measures 2.5 x 1.8 cm. Left periaortic node measures 3.0 x 2.2 cm. Right periaortic node measures about 3.5 x 2.4 cm. Considerations include lymphoma or metastatic disease.  PELVIS FINDINGS: No lower ureteral stones. There is potential mass in the inferior bladder. This is incompletely evaluated. There is left-sided pelvic adenopathy. A large left side pelvic node below the left common iliac bifurcation measures 2.4 x 2.2 cm. There is diffuse sigmoid  diverticulosis. The appendix is normal, as is the distal small bowel. No suspicious osseous lesions in the abdomen or pelvis.      Impression: 1. Retrocrural adenopathy with retroperitoneal adenopathy that extends in the left side of the pelvis. Considerations include lymphoma or metastatic disease. 2. Potential mass in the urinary bladder. 3. Indeterminate lesion in the lateral left kidney. 4. Mild right hydronephrosis secondary to a 3.5 mm stone in the proximal right ureter. 5. Large parapelvic cyst in the right kidney. 6. sigmoid diverticulosis. Large hiatal hernia. Normal appendix. 7. Atherosclerotic disease and coronary artery disease. 8. Urologic consult recommended. The findings above could be further characterized with renal protocol CT using multiphase imaging including delayed images of the bladder. 9. infiltrate or atelectasis in the left lower lobe. Emphysema.  A preliminary report was provided by Dr. Rangel at 0210 hours on 6/11/2018.  This report was finalized on 6/11/2018 8:57 AM by Dr. Pravni Palm MD.        Final diagnoses:   None         ED Medications:  Medications - No data to display    New Medications:     Medication List      ASK your doctor about these medications    docusate sodium 100 MG capsule  Commonly known as:  COLACE  Take 1 capsule by mouth 2 (Two) Times a Day As Needed for Constipation.   Take 1 tablet by mouth 2 times daily as needed for constipation     furosemide 20 MG tablet  Commonly known as:  LASIX     gabapentin 300 MG capsule  Commonly known as:  NEURONTIN     HYDROcodone-acetaminophen 5-325 MG per tablet  Commonly known as:  NORCO  Take 1-2 tabs by mouth every 4-6 hours as needed for pain     omeprazole 20 MG capsule  Commonly known as:  priLOSEC     potassium chloride 10 MEQ CR tablet  Commonly known as:  K-DUR     rivaroxaban 20 MG tablet  Commonly known as:  XARELTO     tamsulosin 0.4 MG capsule 24 hr capsule  Commonly known as:  FLOMAX          Stopped  Medications:     Medication List      ASK your doctor about these medications    docusate sodium 100 MG capsule  Commonly known as:  COLACE  Take 1 capsule by mouth 2 (Two) Times a Day As Needed for Constipation.   Take 1 tablet by mouth 2 times daily as needed for constipation     furosemide 20 MG tablet  Commonly known as:  LASIX     gabapentin 300 MG capsule  Commonly known as:  NEURONTIN     HYDROcodone-acetaminophen 5-325 MG per tablet  Commonly known as:  NORCO  Take 1-2 tabs by mouth every 4-6 hours as needed for pain     omeprazole 20 MG capsule  Commonly known as:  priLOSEC     potassium chloride 10 MEQ CR tablet  Commonly known as:  K-DUR     rivaroxaban 20 MG tablet  Commonly known as:  XARELTO     tamsulosin 0.4 MG capsule 24 hr capsule  Commonly known as:  FLOMAX            Final diagnoses:   Left flank pain   Left renal mass            Vinicius Friedman MD  06/17/18 0859

## 2018-06-20 ENCOUNTER — TRANSCRIBE ORDERS (OUTPATIENT)
Dept: ADMINISTRATIVE | Facility: HOSPITAL | Age: 83
End: 2018-06-20

## 2018-06-20 DIAGNOSIS — N32.9 DISEASE OF BLADDER: Primary | ICD-10-CM

## 2018-07-06 ENCOUNTER — HOSPITAL ENCOUNTER (OUTPATIENT)
Dept: CT IMAGING | Facility: HOSPITAL | Age: 83
Discharge: HOME | End: 2018-07-06
Attending: UROLOGY | Admitting: UROLOGY

## 2018-07-06 DIAGNOSIS — N32.9 DISEASE OF BLADDER: ICD-10-CM

## 2018-07-06 PROCEDURE — 0 IOPAMIDOL PER 1 ML: Performed by: UROLOGY

## 2018-07-06 PROCEDURE — 74178 CT ABD&PLV WO CNTR FLWD CNTR: CPT

## 2018-07-06 RX ADMIN — IOPAMIDOL 100 ML: 755 INJECTION, SOLUTION INTRAVENOUS at 10:30

## 2018-07-09 ENCOUNTER — HOSPITAL ENCOUNTER (EMERGENCY)
Facility: HOSPITAL | Age: 83
Discharge: HOME | End: 2018-07-09
Attending: EMERGENCY MEDICINE | Admitting: EMERGENCY MEDICINE

## 2018-07-09 VITALS
HEIGHT: 70 IN | RESPIRATION RATE: 20 BRPM | OXYGEN SATURATION: 91 % | DIASTOLIC BLOOD PRESSURE: 58 MMHG | BODY MASS INDEX: 26.35 KG/M2 | HEART RATE: 80 BPM | TEMPERATURE: 97.9 F | SYSTOLIC BLOOD PRESSURE: 117 MMHG | WEIGHT: 184.08 LBS

## 2018-07-09 DIAGNOSIS — R31.9 HEMATURIA, UNSPECIFIED TYPE: ICD-10-CM

## 2018-07-09 DIAGNOSIS — R10.12 LEFT UPPER QUADRANT PAIN: Primary | ICD-10-CM

## 2018-07-09 LAB
ALBUMIN SERPL-MCNC: 3.3 G/DL (ref 3.5–5.2)
ALBUMIN/GLOB SERPL: 0.9 G/DL
ALP SERPL-CCNC: 76 U/L (ref 40–129)
ALT SERPL W P-5'-P-CCNC: 12 U/L (ref 5–41)
ANION GAP SERPL CALCULATED.3IONS-SCNC: 8.8 MMOL/L
AST SERPL-CCNC: 26 U/L (ref 5–40)
BACTERIA UR QL AUTO: ABNORMAL /HPF
BASOPHILS # BLD AUTO: 0.03 10*3/MM3 (ref 0–0.2)
BASOPHILS NFR BLD AUTO: 0.5 % (ref 0–2)
BILIRUB SERPL-MCNC: 0.3 MG/DL (ref 0.2–1.2)
BILIRUB UR QL STRIP: NEGATIVE
BUN BLD-MCNC: 10 MG/DL (ref 8–23)
BUN/CREAT SERPL: 10.9 (ref 7–25)
CALCIUM SPEC-SCNC: 9.2 MG/DL (ref 8.8–10.5)
CHLORIDE SERPL-SCNC: 101 MMOL/L (ref 98–107)
CLARITY UR: CLEAR
CO2 SERPL-SCNC: 29.2 MMOL/L (ref 22–29)
COLOR UR: YELLOW
CREAT BLD-MCNC: 0.92 MG/DL (ref 0.76–1.27)
DEPRECATED RDW RBC AUTO: 46.5 FL (ref 37–54)
EOSINOPHIL # BLD AUTO: 0.06 10*3/MM3 (ref 0.1–0.3)
EOSINOPHIL NFR BLD AUTO: 1 % (ref 0–4)
ERYTHROCYTE [DISTWIDTH] IN BLOOD BY AUTOMATED COUNT: 13.8 % (ref 11.5–14.5)
GFR SERPL CREATININE-BSD FRML MDRD: 77 ML/MIN/1.73
GLOBULIN UR ELPH-MCNC: 3.5 GM/DL
GLUCOSE BLD-MCNC: 96 MG/DL (ref 65–99)
GLUCOSE UR STRIP-MCNC: NEGATIVE MG/DL
HCT VFR BLD AUTO: 45.8 % (ref 42–52)
HGB BLD-MCNC: 14.9 G/DL (ref 14–18)
HGB UR QL STRIP.AUTO: ABNORMAL
HYALINE CASTS UR QL AUTO: ABNORMAL /LPF
IMM GRANULOCYTES # BLD: 0.05 10*3/MM3 (ref 0–0.03)
IMM GRANULOCYTES NFR BLD: 0.8 % (ref 0–0.5)
INR PPP: 1.9 (ref 0.9–1.1)
KETONES UR QL STRIP: NEGATIVE
LEUKOCYTE ESTERASE UR QL STRIP.AUTO: NEGATIVE
LIPASE SERPL-CCNC: 29 U/L (ref 13–60)
LYMPHOCYTES # BLD AUTO: 1.23 10*3/MM3 (ref 0.6–4.8)
LYMPHOCYTES NFR BLD AUTO: 19.8 % (ref 20–45)
MCH RBC QN AUTO: 29.9 PG (ref 27–31)
MCHC RBC AUTO-ENTMCNC: 32.5 G/DL (ref 31–37)
MCV RBC AUTO: 92 FL (ref 80–94)
MONOCYTES # BLD AUTO: 0.41 10*3/MM3 (ref 0–1)
MONOCYTES NFR BLD AUTO: 6.6 % (ref 3–8)
NEUTROPHILS # BLD AUTO: 4.43 10*3/MM3 (ref 1.5–8.3)
NEUTROPHILS NFR BLD AUTO: 71.3 % (ref 45–70)
NITRITE UR QL STRIP: NEGATIVE
NRBC BLD MANUAL-RTO: 0 /100 WBC (ref 0–0)
PH UR STRIP.AUTO: 7 [PH] (ref 4.5–8)
PLATELET # BLD AUTO: 93 10*3/MM3 (ref 140–500)
PMV BLD AUTO: 11.8 FL (ref 7.4–10.4)
POTASSIUM BLD-SCNC: 3.9 MMOL/L (ref 3.5–5.2)
PROT SERPL-MCNC: 6.8 G/DL (ref 6–8.5)
PROT UR QL STRIP: NEGATIVE
PROTHROMBIN TIME: 22.1 SECONDS (ref 12.1–15)
RBC # BLD AUTO: 4.98 10*6/MM3 (ref 4.7–6.1)
RBC # UR: ABNORMAL /HPF
REF LAB TEST METHOD: ABNORMAL
SODIUM BLD-SCNC: 139 MMOL/L (ref 136–145)
SP GR UR STRIP: 1.01 (ref 1–1.03)
SQUAMOUS #/AREA URNS HPF: ABNORMAL /HPF
UROBILINOGEN UR QL STRIP: ABNORMAL
WBC NRBC COR # BLD: 6.21 10*3/MM3 (ref 4.8–10.8)
WBC UR QL AUTO: ABNORMAL /HPF

## 2018-07-09 PROCEDURE — 83690 ASSAY OF LIPASE: CPT | Performed by: EMERGENCY MEDICINE

## 2018-07-09 PROCEDURE — 99284 EMERGENCY DEPT VISIT MOD MDM: CPT | Performed by: EMERGENCY MEDICINE

## 2018-07-09 PROCEDURE — 80053 COMPREHEN METABOLIC PANEL: CPT | Performed by: EMERGENCY MEDICINE

## 2018-07-09 PROCEDURE — 81001 URINALYSIS AUTO W/SCOPE: CPT | Performed by: EMERGENCY MEDICINE

## 2018-07-09 PROCEDURE — 85025 COMPLETE CBC W/AUTO DIFF WBC: CPT | Performed by: EMERGENCY MEDICINE

## 2018-07-09 PROCEDURE — 85610 PROTHROMBIN TIME: CPT | Performed by: EMERGENCY MEDICINE

## 2018-07-09 PROCEDURE — 99284 EMERGENCY DEPT VISIT MOD MDM: CPT

## 2018-07-09 RX ORDER — ONDANSETRON 4 MG/1
4 TABLET, ORALLY DISINTEGRATING ORAL ONCE
Status: COMPLETED | OUTPATIENT
Start: 2018-07-09 | End: 2018-07-09

## 2018-07-09 RX ORDER — ALUMINA, MAGNESIA, AND SIMETHICONE 2400; 2400; 240 MG/30ML; MG/30ML; MG/30ML
15 SUSPENSION ORAL ONCE
Status: COMPLETED | OUTPATIENT
Start: 2018-07-09 | End: 2018-07-09

## 2018-07-09 RX ORDER — ONDANSETRON 4 MG/1
4 TABLET, FILM COATED ORAL EVERY 6 HOURS PRN
Qty: 30 TABLET | Refills: 0 | Status: SHIPPED | OUTPATIENT
Start: 2018-07-09

## 2018-07-09 RX ADMIN — ONDANSETRON 4 MG: 4 TABLET, ORALLY DISINTEGRATING ORAL at 14:09

## 2018-07-09 RX ADMIN — LIDOCAINE HYDROCHLORIDE 15 ML: 20 SOLUTION ORAL; TOPICAL at 14:08

## 2018-07-09 RX ADMIN — ALUMINUM HYDROXIDE, MAGNESIUM HYDROXIDE, AND DIMETHICONE 15 ML: 400; 400; 40 SUSPENSION ORAL at 14:08

## 2018-07-09 NOTE — DISCHARGE INSTRUCTIONS
Prior workups in the emergency Department last month suggested a possible bladder mass.  This is a concerning cause for the hematuria and maybe is cause of your intermittent abdominal discomfort.  Please follow-up with a urologist as soon as possible.      It was our pleasure working with you and we hope you are feeling improved. Please follow-up with your regular physician if symptoms persist and return to ED if they change or are getting much worse. Please fill any prescriptions and take medications as scheduled if indicated.

## 2025-05-24 NOTE — ED PROVIDER NOTES
Patient had episode of dyspnea after blood transfusion   Most likely volume overload secondary to blood transfusions, IV fluids/antibiotics, in the setting of chronic hpf EF  most recent echocardiogram 10/24: Left ventricular ejection fraction 50%.  Grade 1 diastolic dysfunction.  Mild hypokinesis of the posterior wall  Chest x-ray: Moderate interstitial edema  Patient was placed on diuresis with IV Lasix with improvement  Appreciate cardiology recommendations: Continue to monitor off diuretics, euvolemic   Subjective   Patient historian.  He presents with vague left-sided abdominal discomfort earlier today associated with some nausea.  He said he felt sick at the time and thought he should come get checked out.  He says he feels better now.  He is sleeping but easily arousable when I entered the room.  He denies vomiting.  He denies nausea presently.  His family is concerned with a man seen some blood in his urine.  He was evaluated for possible kidney stone recently.  He was found to have a possible bladder mass with adenopathy that needed urological follow-up.  I cannot tell if he's had appointment with a urologist or not.            Review of Systems   Constitutional: Negative for chills and fever.   Respiratory: Negative for chest tightness and shortness of breath.    Cardiovascular: Negative for chest pain.   Gastrointestinal: Positive for abdominal pain and nausea. Negative for blood in stool, constipation, diarrhea, rectal pain and vomiting.   Skin: Negative for wound.   Neurological: Negative for headaches.   All other systems reviewed and are negative.      Past Medical History:   Diagnosis Date   • COPD (chronic obstructive pulmonary disease) (CMS/HCC)    • GERD (gastroesophageal reflux disease)    • Peptic ulcer    • Prostate cancer (CMS/HCC)    • Pulmonary embolism (CMS/HCC)        No Known Allergies    Past Surgical History:   Procedure Laterality Date   • ENDOSCOPY W/ PEG REMOVAL     • EYE ENUCLEATION Left        History reviewed. No pertinent family history.    Social History     Social History   • Marital status:      Social History Main Topics   • Smoking status: Current Every Day Smoker     Packs/day: 2.00     Types: Cigarettes   • Alcohol use No   • Drug use: No   • Sexual activity: Defer     Other Topics Concern   • Not on file           Objective   Physical Exam   Constitutional: He is oriented to person, place, and time. He appears well-developed and well-nourished. No distress.   HENT:    Head: Normocephalic and atraumatic.   Eyes: Conjunctivae and EOM are normal.   Neck: Neck supple.   Cardiovascular: Normal rate, regular rhythm and normal heart sounds.    Pulmonary/Chest: Effort normal and breath sounds normal. He has no wheezes.   Abdominal: Soft. Bowel sounds are normal. He exhibits no distension and no mass. There is no rebound and no guarding.   Minimal if any left-sided tenderness to palpation.   Neurological: He is alert and oriented to person, place, and time.   Skin: Skin is warm and dry.   Psychiatric: He has a normal mood and affect.   Nursing note and vitals reviewed.      Procedures           ED Course        Patient is a difficult historian.  He describes possible left-sided pain and possible kidney stones on prior visits to ED.  I reviewed his prior visits.  He had some kidney stones but he also had a possible bladder mass with possible changes around the kidney on the left.  He was told to follow-up with the urologist does not appear to have made that appointment.    Results for orders placed or performed during the hospital encounter of 07/09/18   Comprehensive Metabolic Panel   Result Value Ref Range    Glucose 96 65 - 99 mg/dL    BUN 10 8 - 23 mg/dL    Creatinine 0.92 0.76 - 1.27 mg/dL    Sodium 139 136 - 145 mmol/L    Potassium 3.9 3.5 - 5.2 mmol/L    Chloride 101 98 - 107 mmol/L    CO2 29.2 (H) 22.0 - 29.0 mmol/L    Calcium 9.2 8.8 - 10.5 mg/dL    Total Protein 6.8 6.0 - 8.5 g/dL    Albumin 3.30 (L) 3.50 - 5.20 g/dL    ALT (SGPT) 12 5 - 41 U/L    AST (SGOT) 26 5 - 40 U/L    Alkaline Phosphatase 76 40 - 129 U/L    Total Bilirubin 0.3 0.2 - 1.2 mg/dL    eGFR Non African Amer 77 >60 mL/min/1.73    Globulin 3.5 gm/dL    A/G Ratio 0.9 g/dL    BUN/Creatinine Ratio 10.9 7.0 - 25.0    Anion Gap 8.8 mmol/L   Protime-INR   Result Value Ref Range    Protime 22.1 (H) 12.1 - 15.0 Seconds    INR 1.90 (H) 0.90 - 1.10   Urinalysis With Culture If Indicated - Urine, Clean Catch   Result Value  Ref Range    Color, UA Yellow Yellow, Straw    Appearance, UA Clear Clear    pH, UA 7.0 4.5 - 8.0    Specific Gravity, UA 1.015 1.003 - 1.030    Glucose, UA Negative Negative    Ketones, UA Negative Negative, 80 mg/dL (3+), >=160 mg/dL (4+)    Bilirubin, UA Negative Negative    Blood, UA Trace (A) Negative    Protein, UA Negative Negative    Leuk Esterase, UA Negative Negative    Nitrite, UA Negative Negative    Urobilinogen, UA 0.2 E.U./dL 0.2 - 1.0 E.U./dL   Lipase   Result Value Ref Range    Lipase 29 13 - 60 U/L   CBC Auto Differential   Result Value Ref Range    WBC 6.21 4.80 - 10.80 10*3/mm3    RBC 4.98 4.70 - 6.10 10*6/mm3    Hemoglobin 14.9 14.0 - 18.0 g/dL    Hematocrit 45.8 42.0 - 52.0 %    MCV 92.0 80.0 - 94.0 fL    MCH 29.9 27.0 - 31.0 pg    MCHC 32.5 31.0 - 37.0 g/dL    RDW 13.8 11.5 - 14.5 %    RDW-SD 46.5 37.0 - 54.0 fl    MPV 11.8 (H) 7.4 - 10.4 fL    Platelets 93 (L) 140 - 500 10*3/mm3    Neutrophil % 71.3 (H) 45.0 - 70.0 %    Lymphocyte % 19.8 (L) 20.0 - 45.0 %    Monocyte % 6.6 3.0 - 8.0 %    Eosinophil % 1.0 0.0 - 4.0 %    Basophil % 0.5 0.0 - 2.0 %    Immature Grans % 0.8 (H) 0.0 - 0.5 %    Neutrophils, Absolute 4.43 1.50 - 8.30 10*3/mm3    Lymphocytes, Absolute 1.23 0.60 - 4.80 10*3/mm3    Monocytes, Absolute 0.41 0.00 - 1.00 10*3/mm3    Eosinophils, Absolute 0.06 (L) 0.10 - 0.30 10*3/mm3    Basophils, Absolute 0.03 0.00 - 0.20 10*3/mm3    Immature Grans, Absolute 0.05 (H) 0.00 - 0.03 10*3/mm3    nRBC 0.0 0.0 - 0.0 /100 WBC   Urinalysis, Microscopic Only - Urine, Clean Catch   Result Value Ref Range    RBC, UA 6-12 (A) None Seen /HPF    WBC, UA 0-2 (A) None Seen /HPF    Bacteria, UA None Seen None Seen /HPF    Squamous Epithelial Cells, UA None Seen None Seen, 0-2 /HPF    Hyaline Casts, UA None Seen None Seen /LPF    Methodology Manual Light Microscopy                MDM  Number of Diagnoses or Management Options  Diagnosis management comments: My differential diagnosis for abdominal pain  includes but is not limited to:  Gastritis, gastroenteritis, peptic ulcer disease, GERD, esophageal perforation, acute appendicitis, mesenteric adenitis, Meckel’s diverticulum, epiploic appendagitis, diverticulitis, colon cancer, ulcerative colitis, Crohn’s disease, intussusception, small bowel obstruction, adhesions, ischemic bowel, perforated viscus, ileus, obstipation, biliary colic, cholecystitis, cholelithiasis, Eduardo-Michael Lexa, hepatitis, pancreatitis, common bile duct obstruction, cholangitis, bile leak, splenic trauma, splenic rupture, splenic infarction, splenic abscess, abdominal abscess, ascites, spontaneous bacterial peritonitis, hernia, UTI, cystitis, prostatitis, ureterolithiasis, urinary obstruction, ovarian cyst, torsion, pregnancy, ectopic pregnancy, PID, pelvic abscess, mittelschmerz, endometriosis, AAA, myocardial infarction, pneumonia, cancer, porphyria, DKA, medications, sickle cell, viral syndrome, zoster       Amount and/or Complexity of Data Reviewed  Clinical lab tests: reviewed and ordered  Tests in the radiology section of CPT®: ordered and reviewed  Review and summarize past medical records: yes (Recent ED visit with CT scan which showed possible bladder mass and possible adenopathy within the abdomen.  Referral to urology was recommended at that time.)    Risk of Complications, Morbidity, and/or Mortality  Presenting problems: high  Diagnostic procedures: high  Management options: moderate    Patient Progress  Patient progress: improved    Abdomen reassuring.  I have talked to him about his prior visit and the results and recommended follow-up with a urologist.  I do not feel there is a new source of his pain at present and his vital signs are stable.  He feels better after Zofran and a GI cocktail.  He is comfortable with discharge at this time.    Final diagnoses:   Left upper quadrant pain   Hematuria, unspecified type            Jorge Valle MD  07/09/18 0535